# Patient Record
Sex: FEMALE | Race: WHITE | NOT HISPANIC OR LATINO | Employment: FULL TIME | URBAN - METROPOLITAN AREA
[De-identification: names, ages, dates, MRNs, and addresses within clinical notes are randomized per-mention and may not be internally consistent; named-entity substitution may affect disease eponyms.]

---

## 2021-07-01 ENCOUNTER — PATIENT MESSAGE (OUTPATIENT)
Dept: ADMINISTRATIVE | Facility: OTHER | Age: 31
End: 2021-07-01

## 2024-06-24 ENCOUNTER — TELEPHONE (OUTPATIENT)
Dept: NEUROLOGY | Facility: CLINIC | Age: 34
End: 2024-06-24
Payer: COMMERCIAL

## 2024-06-24 NOTE — TELEPHONE ENCOUNTER
----- Message from Diamone Speed sent at 6/24/2024  1:31 PM CDT -----  Regarding: self  Type: Patient Call Back       Who called: self        What is the request in detail: needs a call back to get an appt        Can the clinic reply by [facility]? Yes       Would the patient rather a call back or a response via [facility]? Call tai       Best call back number:[phone]      Additional Information:

## 2024-10-21 ENCOUNTER — LAB VISIT (OUTPATIENT)
Dept: LAB | Facility: HOSPITAL | Age: 34
End: 2024-10-21
Attending: PSYCHIATRY & NEUROLOGY
Payer: COMMERCIAL

## 2024-10-21 ENCOUNTER — PATIENT MESSAGE (OUTPATIENT)
Dept: NEUROLOGY | Facility: CLINIC | Age: 34
End: 2024-10-21

## 2024-10-21 ENCOUNTER — OFFICE VISIT (OUTPATIENT)
Dept: NEUROLOGY | Facility: CLINIC | Age: 34
End: 2024-10-21
Payer: COMMERCIAL

## 2024-10-21 VITALS
HEIGHT: 65 IN | RESPIRATION RATE: 16 BRPM | DIASTOLIC BLOOD PRESSURE: 87 MMHG | WEIGHT: 145.5 LBS | BODY MASS INDEX: 24.24 KG/M2 | SYSTOLIC BLOOD PRESSURE: 123 MMHG

## 2024-10-21 DIAGNOSIS — R29.818 OTHER SYMPTOMS AND SIGNS INVOLVING THE NERVOUS SYSTEM: ICD-10-CM

## 2024-10-21 DIAGNOSIS — Z82.49 FAMILY HISTORY OF ANEURYSM: ICD-10-CM

## 2024-10-21 DIAGNOSIS — G43.009 MIGRAINE WITHOUT AURA AND WITHOUT STATUS MIGRAINOSUS, NOT INTRACTABLE: ICD-10-CM

## 2024-10-21 DIAGNOSIS — G43.009 MIGRAINE WITHOUT AURA AND WITHOUT STATUS MIGRAINOSUS, NOT INTRACTABLE: Primary | ICD-10-CM

## 2024-10-21 LAB
CREAT SERPL-MCNC: 0.8 MG/DL (ref 0.5–1.4)
EST. GFR  (NO RACE VARIABLE): >60 ML/MIN/1.73 M^2

## 2024-10-21 PROCEDURE — 82565 ASSAY OF CREATININE: CPT | Performed by: PSYCHIATRY & NEUROLOGY

## 2024-10-21 PROCEDURE — 99999 PR PBB SHADOW E&M-EST. PATIENT-LVL V: CPT | Mod: PBBFAC,,, | Performed by: PSYCHIATRY & NEUROLOGY

## 2024-10-21 PROCEDURE — 1159F MED LIST DOCD IN RCRD: CPT | Mod: CPTII,S$GLB,, | Performed by: PSYCHIATRY & NEUROLOGY

## 2024-10-21 PROCEDURE — 36415 COLL VENOUS BLD VENIPUNCTURE: CPT | Performed by: PSYCHIATRY & NEUROLOGY

## 2024-10-21 PROCEDURE — 3074F SYST BP LT 130 MM HG: CPT | Mod: CPTII,S$GLB,, | Performed by: PSYCHIATRY & NEUROLOGY

## 2024-10-21 PROCEDURE — 99205 OFFICE O/P NEW HI 60 MIN: CPT | Mod: S$GLB,,, | Performed by: PSYCHIATRY & NEUROLOGY

## 2024-10-21 PROCEDURE — 3079F DIAST BP 80-89 MM HG: CPT | Mod: CPTII,S$GLB,, | Performed by: PSYCHIATRY & NEUROLOGY

## 2024-10-21 PROCEDURE — 3008F BODY MASS INDEX DOCD: CPT | Mod: CPTII,S$GLB,, | Performed by: PSYCHIATRY & NEUROLOGY

## 2024-10-21 RX ORDER — MUPIROCIN 20 MG/G
OINTMENT TOPICAL
COMMUNITY
Start: 2024-10-15

## 2024-10-21 RX ORDER — TOPIRAMATE 25 MG/1
25 TABLET ORAL 2 TIMES DAILY
Qty: 180 TABLET | Refills: 3 | Status: SHIPPED | OUTPATIENT
Start: 2024-10-21 | End: 2025-10-21

## 2024-10-21 RX ORDER — RIZATRIPTAN BENZOATE 10 MG/1
10 TABLET, ORALLY DISINTEGRATING ORAL
Qty: 9 TABLET | Refills: 3 | Status: SHIPPED | OUTPATIENT
Start: 2024-10-21 | End: 2024-11-20

## 2024-10-21 RX ORDER — KETOCONAZOLE 20 MG/ML
SHAMPOO, SUSPENSION TOPICAL
COMMUNITY
Start: 2024-10-15

## 2024-10-21 RX ORDER — EFINACONAZOLE 100 MG/ML
SOLUTION TOPICAL
COMMUNITY
Start: 2024-10-15

## 2024-10-21 NOTE — PROGRESS NOTES
"Subjective:       Patient ID: Hayley Contreras is a 34 y.o. female.    Chief Complaint: Headache          HPI    The patient presented on 10- for evaluation of headaches.        The patient is here for evaluation of longstanding history of headaches. The headaches progress from different areas and involves different sides with a throbbing nature. It can involve the right side, the left side or both sides. No visual aura. No associated neurological deficits.  The headaches are getting more frequent and come every day and OTC Ibuprofen at 800 mg was not helpful. The headaches are also getting more severe 6-8/10 headaches that cause significant morbidity and cluster around menstrual period. The headache is associated with nausea and light, sound and smell sensitivity. The patient feels "down" during the headache with no racing. Physical and emotional stressors provoke and aggravate the headache.  The headache builds up slowly towards the middle of the day or the end of the day.   The headaches are progressively getting worse and interfering with daily activity.  The headache is associated with scalp sensitivity. No TMJ problems.  The patient denies blurry vision and ear ringing. The headache is not positional or postural but worsens with movement and Valsalva. Sleep help lessen the headache. No history of head trauma. No history of seizures. No history of smoking. No caffeine overuse. No vertigo. No blacking out.  No fever, chills, or rigors. No history of significant memory loss. No history of strokes.  The patient cannot think of food that aggravates the headache. Family history is unremarkable for migraine but significant for cerebral aneurysm.           Review of Systems   Constitutional:  Negative for appetite change and fatigue.   HENT:  Negative for hearing loss and tinnitus.    Eyes:  Negative for photophobia and visual disturbance.   Respiratory:  Negative for apnea and shortness of breath.  "   Cardiovascular:  Negative for chest pain and palpitations.   Gastrointestinal:  Negative for nausea and vomiting.   Endocrine: Negative for cold intolerance and heat intolerance.   Genitourinary:  Negative for difficulty urinating and urgency.   Musculoskeletal:  Negative for arthralgias, back pain, gait problem, joint swelling, myalgias, neck pain and neck stiffness.   Skin:  Negative for color change and rash.   Allergic/Immunologic: Negative for environmental allergies and immunocompromised state.   Neurological:  Positive for headaches. Negative for dizziness, tremors, seizures, syncope, facial asymmetry, speech difficulty, weakness, light-headedness and numbness.   Hematological:  Negative for adenopathy. Does not bruise/bleed easily.   Psychiatric/Behavioral:  Negative for agitation, behavioral problems, confusion, decreased concentration, dysphoric mood, hallucinations, self-injury, sleep disturbance and suicidal ideas. The patient is not hyperactive.                  Current Outpatient Medications:     clindamycin-benzoyl peroxide gel, Apply topically once daily., Disp: 45 g, Rfl: 2    JUBLIA 10 % Javi, Apply topically., Disp: , Rfl:     ketoconazole (NIZORAL) 2 % shampoo, Apply topically., Disp: , Rfl:     multivitamin (THERAGRAN) per tablet, Take 1 tablet by mouth once daily., Disp: , Rfl:     mupirocin (BACTROBAN) 2 % ointment, SMARTSI Application Topical 2-3 Times Daily, Disp: , Rfl:     omeprazole (PRILOSEC) 40 MG capsule, Take 1 capsule (40 mg total) by mouth once daily., Disp: 90 capsule, Rfl: 3    UNABLE TO FIND, Inject 0.5 mg into the skin once a week. medication name: semiglutide, Disp: , Rfl:     rizatriptan (MAXALT-MLT) 10 MG disintegrating tablet, Take 1 tablet (10 mg total) by mouth as needed for Migraine (Max 2-3 times a week). May repeat in 2 hours if needed, Disp: 9 tablet, Rfl: 3    topiramate (TOPAMAX) 25 MG tablet, Take 1 tablet (25 mg total) by mouth 2 (two) times daily. Start 1 tab  QHS for 1 week then 1 tab BID., Disp: 180 tablet, Rfl: 3    Past Medical History:   Diagnosis Date    Acne     Adult    Allergy     Anxiety     Asthma     COVID-19 01/17/2022    covid vaccine X 2 (pfizer)    Depression     History of chicken pox        Past Surgical History:   Procedure Laterality Date    ARTHROSCOPY OF KNEE Bilateral     COLONOSCOPY  05/17/2021    TONSILLECTOMY      WISDOM TOOTH EXTRACTION         Social History     Socioeconomic History    Marital status:     Number of children: 2   Occupational History    Occupation: Dental Hygenist     Comment: [employer]   Tobacco Use    Smoking status: Never    Smokeless tobacco: Never   Substance and Sexual Activity    Alcohol use: Yes     Comment: Social    Drug use: Never    Sexual activity: Yes     Partners: Male     Birth control/protection: Partner-Vasectomy             Past/Current Medical/Surgical History, Past/Current Social History, Past/Current Family History and Past/Current Medications were reviewed in detail.        Objective:           VITAL SIGNS WERE REVIEWED      GENERAL APPEARANCE:     The patient looks comfortable.    BMI 24.21    No signs of respiratory distress.    Normal breathing pattern.    No dysmorphic features    Normal eye contact.       GENERAL MEDICAL EXAM:    HEENT:  Head is atraumatic normocephalic.     FUNDUSCOPIC (OPHTHALMOSCOPIC) EXAMINATION showed no disc edema (papilledema).      NECK: No JVD. No visible lesions or goiters.     CHEST-CARDIOPULMONARY: No cyanosis. No tachypnea. Normal respiratory effort.    NKILCWZ-HOYKSXPDETJHJEKT-MANGEMVSJI: No jaundice. No stomas or lesions. No visible hernias. No catheters.     SKIN, HAIR, NAILS: No pathognomonic skin rash.No neurofibromatosis. No visible lesions.No stigmata of autoimmune disease. No clubbing.    LIMBS: No varicose veins. No visible swelling.    MUSCULOSKELETAL: No visible deformities.No visible lesions.             Neurological Exam  Mental Status  Awake  and alert. Oriented to person, place, time and situation. Recent and remote memory are intact. Speech is normal. Language is fluent with no aphasia. Attention and concentration are normal. Fund of knowledge is appropriate for level of education. Apraxia absent.    Cranial Nerves  CN II: Tested with correction. Visual acuity is normal. Visual fields full to confrontation. Right funduscopic exam: disc intact. Left funduscopic exam: disc intact.  CN III, IV, VI: Extraocular movements intact bilaterally. Normal lids and orbits bilaterally. Pupils equal round and reactive to light bilaterally.  CN V: Facial sensation is normal.  CN VII: Full and symmetric facial movement.  CN VIII: Hearing is normal.  CN IX, X: Palate elevates symmetrically  CN XI: Shoulder shrug strength is normal.  CN XII: Tongue midline without atrophy or fasciculations.    Motor  Normal muscle bulk throughout. No fasciculations present. Normal muscle tone. No abnormal involuntary movements. No pronator drift.                                             Right                     Left  Neck flexion                           5                          5  Neck extension                      5                          5   Shoulder abduction               5                          5   Shoulder adduction               5                          5   Shoulder internal rotation      5                          5  Shoulder external rotation     5                          5  Elbow flexion                         5                          5  Elbow extension                    5                          5  Wrist flexion                           5                          5  Wrist extension                      5                          5  Supination                             5                          5  Pronation                               5                          5  Finger flexion                         5                          5  Finger extension                     5                          5  Finger abduction                    5                          5  Thumb flexion                        5                          5  Thumb extension                   5                          5  Thumb abduction                   5                          5  Hip flexion                              5                          5  Hip extension                         5                          5  Hip abduction                         5                          5  Hip adduction                         5                          5  Knee flexion                           5                          5  Knee extension                      5                          5  Ankle inversion                      5                          5  Ankle eversion                       5                          5  Plantarflexion                         5                          5  Dorsiflexion                            5                          5  Toe flexion                             5                          5  Toe extension                        5                          5    Sensory  Light touch is normal in upper and lower extremities. Pinprick is normal in upper and lower extremities. Temperature is normal in upper and lower extremities. Vibration is normal in upper and lower extremities. Proprioception is normal in upper and lower extremities.  No right-sided hemispatial neglect. No left-sided hemispatial neglect. Right agraphesthesia absent. Left agraphesthesia absent. Right astereognosis absent. Left astereognosis absent.    Reflexes                                            Right                      Left  Brachioradialis                    2+                         2+  Biceps                                 2+                         2+  Triceps                                2+                         2+  Patellar                                2+                          2+  Achilles                                2+                         2+  Right Plantar: normal  Left Plantar: normal  Jaw jerk absent.  Right pathological reflexes: Shanna's absent. Crossed adductor absent. Ankle clonus absent.  Left pathological reflexes: Shanna's absent. Crossed adductor absent. Ankle clonus absent.    Coordination  Right: Finger-to-nose normal. Rapid alternating movement normal. Heel-to-shin normal.Left: Finger-to-nose normal. Rapid alternating movement normal. Heel-to-shin normal.    Gait  Casual gait is normal including stance, stride, and arm swing.Normal toe walking. Normal heel walking. Normal tandem gait. Romberg is absent. Able to rise from chair without using arms.        Lab Results   Component Value Date    WBC 4.6 06/24/2024    HGB 14.3 06/24/2024    HCT 43.4 06/24/2024    MCV 92 06/24/2024     06/24/2024       Sodium   Date Value Ref Range Status   06/24/2024 141 134 - 144 mmol/L Final     Potassium   Date Value Ref Range Status   06/24/2024 4.5 3.5 - 5.2 mmol/L Final     Chloride   Date Value Ref Range Status   06/24/2024 104 96 - 106 mmol/L Final     CO2   Date Value Ref Range Status   06/24/2024 26 20 - 29 mmol/L Final     Glucose   Date Value Ref Range Status   06/24/2024 81 70 - 99 mg/dL Final     BUN   Date Value Ref Range Status   06/24/2024 11 6 - 20 mg/dL Final     Creatinine   Date Value Ref Range Status   10/21/2024 0.8 0.5 - 1.4 mg/dL Final     Calcium   Date Value Ref Range Status   06/24/2024 9.5 8.7 - 10.2 mg/dL Final     Total Protein   Date Value Ref Range Status   04/29/2020 6.6 6.0 - 8.5 g/dL Final     Albumin   Date Value Ref Range Status   06/24/2024 4.4 3.9 - 4.9 g/dL Final   04/29/2020 4.4 3.8 - 4.8 g/dL Final     Total Bilirubin   Date Value Ref Range Status   06/24/2024 0.2 0.0 - 1.2 mg/dL Final     Alkaline Phosphatase   Date Value Ref Range Status   04/29/2020 68 39 - 117 IU/L Final     AST   Date Value Ref Range Status   06/24/2024 21 0 - 40  IU/L Final     ALT   Date Value Ref Range Status   06/24/2024 30 0 - 32 IU/L Final     eGFR if non    Date Value Ref Range Status   08/24/2021 100 >59 mL/min/1.73 Final           Lab Results   Component Value Date    TSH 1.250 06/24/2024    W0OCVUR 7.3 08/24/2021       LABORATORY EVALUATION      [id number]      CBC, CMP, TFT , HA1C NL.        RADIOLOGY EVALUATION       NEUROPHYSIOLOGY EVALUATION       PATHOLOGY EVALUATION        NEUROCOGNITIVE AND NEUROPSYCHOLOGY EVALUATION           Reviewed the neuroimaging independently       Assessment:           1. Migraine without aura and without status migrainosus, not intractable    2. Other symptoms and signs involving the nervous system    3. Family history of aneurysm          Plan:            IS STATUS POST VASECTOMY       MIGRAINE, COMMON, WITHOUT AURA, EPISODIC, HIGH FREQUENCY         EVALUATION       BRAIN MRI WO.    CTA H/N.        MANAGEMENT       HEADACHE DIARY     DISCUSSED THE THREE-FOLD MANAGEMENT OF MIGRAINE:      LIFESTYLE CHANGES:       Good sleep hygiene  Avoid general triggers like lack of sleep/too much sleep, prolonged sun exposure, excessive screen time and specific triggers based on you own diary   Minimize physical and emotional stress  Smoking avoidance and cessation  Limit caffeine drinks to 1-2 a day   Good hydration   Small frequent meals and avoid skipping meals   Moderate 30-minute-long aerobic exercises 3 times/week. Avoid strenuous exercise         ABORTIVE MEDICATIONS (ACUTE-RESCUE MEDICATIONS):     Should only be taken 2-3 times/week to avoid rebound and overuse headaches.    I-explained to the patient that pain meds especially triptans should NOT be taken daily to avoid Rebound Headache and Overuse Headache.    Take at the ONSET of the headache Rizatriptan (Maxalt MLT) 10 mg PO  PRN.        AVOID NARCOTICS (OPIATES)      1. No randomized controlled study shows pain-free results with opioids in the treatment of  migraine.     2. The physiologic consequences of opioid use are adverse, occur quickly, and can be permanent. Decreased gray matter, release of calcitonin gene-related peptide, dynorphin, and pro-inflammatory peptides, and activation of excitatory glutamate receptors are all associated with opioid exposure.     3. Opioids are pro-nociceptive, prevent reversal of migraine central sensitization, and interfere with triptan effectiveness.     4.Opioids precipitate bad clinical outcomes, especially transformation to daily headache.     5. They cause disease progression, comorbidity, and excessive health care consumption.           NEXT OPTIONS:    Triptans: Sumatriptan (Imitrex)    Gepants: Nuretc (rimegepant)75 mg >Ubrelvy (ubrogepant) 100 mg    Ditans: Reyvow (lasmiditan) 100 mg (No driving due to sedation)    Fioricet without codeine with Reglan.      Prednisone with Reglan.      LAST RESORT:     DHE NS Trudhesa (Max 2 a week)     C/I: concomitant use of vasoconstrictors like Triptans, strong CY inhibitors such as HAART PIs (eg, ritonavir, nelfinavir, or indinavir) and Macrolides (eg, erythromycin or clarithromycin), CAD, PVD, Stroke/TIA and Uncontrolled HTN.  Serious SEs include Vasospasm and Fibrosis (chronic use).           PREVENTATIVE (MORE ACCURATELY MIGRAINE REDUCTION) MEDICATIONS:           Since the patient's headache is very frequent a lengthy discussion about preventative medications was carried out.The patient understands that prevention means DECREASING frequency and severity and NOT elimination.The patient was made aware that any new medication can cause serious allergic reaction.The medication is considered failure only if a therapeutic dose reached and maintained for 6-8 weeks.        Topiramate/Topamax (TPM) slow titration to 25 mg BID which can cause mental slowing, transient tingling, kidney stones, weight loss, cleft lip and palate and rarely glaucoma and visual field defects . The patient was  encouraged to drink a lot of fluids.     HELPFUL SUPPLEMENTS:     Helpful supplements include Co-Q 10, B2, Mg, Feverfew (Dolovent combination) and butterbur (Petadolex)        NEUROPHARMACOLOGY     NEXT OPTIONS:         Zonisamide/Zonegran (ZNS) 100-400 mg QHS is a good alternative to TPM in case of SE/AE.     Amitriptyline/Elavil (TCA) slow titration to 100-Age which can cause sleepiness, dry eyes, dry mouth, urinary retention, and rarely cardiac arrhythmias    Propranolol/Inderal  (BB)slow titration to 80 mg BID which can cause low blood pressure, slow heart rate, erectile dysfunction, depression, airway obstruction and heart failure exacerbations. Cannot be used with migraine associate with focal neurological deficits.    Lamotrigine/Lamictal  (LTG)slow titration to 100 mg BID which can cause serious skin rash and rare cardiac arrhythmias. LTG is superior to other therapies for specifically reducing migraine aura.     ANTI-CGRP AGENTS: Qulipta (alogepant) 60 mg QD, Erenumab (Aimovig) 140 mg SQ Pen monthly (Reported cases of Constipation and BP elevation) , Galcanezumab (Emgality) 120 mg SQ Pen monthly after a loading dose of 240 mg  and Fremnezumab (Ajovy) (Ligand Blocker): 225 mg SQ monthly or 675 mg every 3 months     Botox 200 units every 3 months .        LAST RESORT OPTIONS:      Namenda 10 mg BID     Valproic acid/ Depakote         NEUROMODULATION     Cefaly, Relivion, Nerivio and GammaCore (VNS)               WOMEN IN CHILD BEARING PERIOD     All migraine medications are not safe during pregnancy and the patient was made aware of this fact. Any pregnancy should be planned, and medications should be stopped PRIOR to pregnancy planning. Folic acid 1 mg daily was recommended. However, hormonal birth control complicates the management of migraine and can exacerbate migraine. If possible, mechanical contraception should be a better option.         MEDICAL/SURGICAL COMORBIDITIES     All relevant medical  comorbidities noted and managed by primary care physician and medical care team.          HEALTHY LIFESTYLE AND PREVENTATIVE CARE    The patient to adhere to the age-appropriate health maintenance guidelines including screening tests and vaccinations. The patient to adhere to  healthy lifestyle, optimal weight, exercise, healthy diet, good sleep hygiene and avoiding drugs including smoking, alcohol and recreational drugs.          RTC 3 MONTHS     Jennifer Trejo MD, FAAN    Attending Neurologist/Epileptologist         Diplomate, American Board of Psychiatry and Neurology    Diplomate, American Board of Clinical Neurophysiology     Fellow, American Academy of Neurology       I spent a total of 62 minutes on the day of the visit.  This includes face to face time and non-face to face time preparing to see the patient (eg, review of tests), obtaining and/or reviewing separately obtained history, documenting clinical information in the electronic or other health record, independently interpreting results and communicating results to the patient/family/caregiver, or care coordinator.

## 2024-10-21 NOTE — PATIENT INSTRUCTIONS
LIFESTYLE CHANGES:       Good sleep hygiene  Avoid general triggers like lack of sleep/too much sleep, prolonged sun exposure, excessive screen time and specific triggers based on you own diary   Minimize physical and emotional stress  Smoking avoidance and cessation  Limit caffeine drinks to 1-2 a day   Good hydration   Small frequent meals and avoid skipping meals   Moderate 30-minute-long aerobic exercises 3 times/week. Avoid strenuous exercise           HELPFUL SUPPLEMENTS:     Helpful supplements include Co-Q 10, B2, Mg, Feverfew (Dolovent combination) and butterbur (Petadolex)        What Is a Migraine? 31 Facts vs. Fiction    Migraine is a common neurological disorder that affects 12% to 15% of general population. Despite that, it is usually underdiagnosed and inadequately treated due to many misconceptions surrounding the diagnosis and treatment.    Myth: Migraine affects women only. Fact: Migraine affect both women and men, though they are more common in women.  Myth: Migraine starts in the teen years. Fact: Migraine can start at any age, though its more common than the teenage period.  Myth: People outgrow migraines. Fact: While some may outgrow their migraines, most patients do not.  Myth: Migraine is just a bad headache. Fact: Migraine is a syndrome with many symptoms (physical and behavioral). Headache is only one of them. Some migraine attacks occur without headaches. Migraine can cause symptoms that can mimic stroke.  Myth: Migraines must have aura (symptoms or sensations, such as flashing lights, which can come before a migraine headache.) Fact: Most migraines are called common migraines and are not preceded by aura.  Myth: A migraine begins when the headache starts. Fact: A prodromal period lasts two to three days before the headache starts. Patients may have irritability, sleep problems and other behavioral symptoms before the headache starts.  Myth: Migraine ends when the headache is  gone. Fact: A postdrome period after the headache is over can last few days and cause excessive fatigue.  Myth: There is always a trigger for migraines. Fact: Many patients cannot identify triggers.  Myth: Migraine triggers are universal. Fact: Every patient has unique migraine triggers -- if they can identify triggers.  Myth: Migraine symptoms are the same in different people. Fact: Migraine symptoms vary from patient to patient and even in the same patient. Migraines are unpredictable and continue to change throughout life.  Myth: Bad headache means migraine. Fact: Migraine can cause bad headache but there are numerous types of headaches other than migraine.  Myth: Mild headache is not migraine. Fact: Migraine causes mild, moderate or severe headaches. The severity has nothing to do with migraine diagnosis.  Myth: Brain scans diagnose migraine. Fact: migraine is a clinical diagnosis based on symptoms. Brain scans are done to rule out migraine mimickers. Brain scans are normal in migraine patients. The disease process is neurochemical and not structural.  Myth: There are no good treatments for migraine. Fact: While there is no cure for migraines, there are numerous treatment options that can help control and reduce migraines.  Myth: Migraine preventative medications should get rid of all migraines. Fact: while this can happen, the goal of treatment is 50% reduction of frequency and severity of migraines.  Myth: I probably just have a sinus headache. Fact: Ninety percent of self-diagnosed sinus headaches are undiagnosed migraines.  Myth: Lifestyle has no impact on migraines. Fact: Lifestyle has significant impact on migraine and modifications can help.  Myth: Migraines are always hormone related. Fact: Some migraines are influenced by hormonal changes (menstrual period, birth control, hormone replacement, pregnancy and endometriosis), but not all migraines.  Myth: Taking daily over-the-counter medication is a  good idea. Fact: Taking abortive medications, which stop a migraine when you feel one coming or once it has begun, more than two to three times a week worsens migraines and causes chronic rebound headaches.  Myth: Caffeine helps with migraine. Fact: In the short term, this is true. However, overuse of caffeine can make migraine headaches worse long-term.  Myth: Migraine preventative medications work very quickly. Fact: While abortive medications work quickly, preventative medications require several weeks to months to have full effect.  Myth: No migraine treatment is available if you are pregnant. Fact: While most migraine medications are not safe during pregnancy, there are options like acetaminophen, antinausea medications and nerve stimulation devices like Cefaly and Relivion.  Myth: Anyone can diagnose and treat migraine. Fact: Migraines are usually misdiagnosed so it important to seek treatment for a specialist that is adequately trained to properly treat.  Myth: Opiates (narcotics) are good for migraine headaches. Fact: Opiates can have negative results, including leading to daily headaches.  Myth: All migraine treatments involve taking tablets. Fact: Injectable options and even nerve stimulation devices like Cefaly and Relivion are available.  Myth: Botox injections are not used in migraine headaches. Fact: Botox injections are very effective treatment options for patients who do not respond to first-line treatment.  Myth: Supplements and alternative methods do not help with migraines. Fact: Some supplements like vitamin B2, magnesium, Co-Q 10, butterbur and feverfew do help. Massage therapy, essential oils and acupuncture can help as well. It is best to use such methods as complementary to standard medical treatment rather than as an alternative therapy.  Myth: You must choose one thing to help with migraines. Fact: Migraine management is hard work, and the patient will need to do many things at the same  time to control migraines including lifestyle changes, triggers avoidance and sometimes a combination of different medications.  Myth: Piercing and earrings can treat migraines. Fact: We do not have any evidence to support the use of piercing as a migraine treatment. Some of our patients report that piercing temporarily helped reduce their headaches  Myth: Migraines are bothersome but not serious. Fact: Some migraines can last a long time. This is called migrainosus, and it causes significant physical and emotional harm.  Myth: There is no surgical treatment for migraine. Fact: There are surgical treatment options for migraines that do not respond to medical management. The specific type of surgery depends on the nature of the migraines.

## 2024-11-05 ENCOUNTER — HOSPITAL ENCOUNTER (OUTPATIENT)
Dept: RADIOLOGY | Facility: HOSPITAL | Age: 34
Discharge: HOME | End: 2024-11-05
Attending: PSYCHIATRY & NEUROLOGY
Payer: COMMERCIAL

## 2024-11-05 DIAGNOSIS — R29.818 OTHER SYMPTOMS AND SIGNS INVOLVING THE NERVOUS SYSTEM: ICD-10-CM

## 2024-11-05 DIAGNOSIS — G43.009 MIGRAINE WITHOUT AURA AND WITHOUT STATUS MIGRAINOSUS, NOT INTRACTABLE: ICD-10-CM

## 2024-11-05 DIAGNOSIS — Z82.49 FAMILY HISTORY OF ANEURYSM: ICD-10-CM

## 2024-11-05 PROCEDURE — 70551 MRI BRAIN STEM W/O DYE: CPT | Mod: 26,,, | Performed by: RADIOLOGY

## 2024-11-05 PROCEDURE — 70498 CT ANGIOGRAPHY NECK: CPT | Mod: 26,,, | Performed by: RADIOLOGY

## 2024-11-05 PROCEDURE — 70496 CT ANGIOGRAPHY HEAD: CPT | Mod: 26,,, | Performed by: RADIOLOGY

## 2024-11-05 PROCEDURE — 70498 CT ANGIOGRAPHY NECK: CPT | Mod: TC

## 2024-11-05 PROCEDURE — 70551 MRI BRAIN STEM W/O DYE: CPT | Mod: TC

## 2024-11-05 PROCEDURE — 25500020 PHARM REV CODE 255: Performed by: PSYCHIATRY & NEUROLOGY

## 2024-11-05 RX ADMIN — IOHEXOL 100 ML: 350 INJECTION, SOLUTION INTRAVENOUS at 04:11

## 2024-12-09 ENCOUNTER — PATIENT MESSAGE (OUTPATIENT)
Dept: NEUROLOGY | Facility: CLINIC | Age: 34
End: 2024-12-09
Payer: COMMERCIAL

## 2024-12-12 ENCOUNTER — PATIENT MESSAGE (OUTPATIENT)
Dept: RESEARCH | Facility: HOSPITAL | Age: 34
End: 2024-12-12
Payer: COMMERCIAL

## 2025-02-10 ENCOUNTER — PATIENT MESSAGE (OUTPATIENT)
Dept: NEUROLOGY | Facility: CLINIC | Age: 35
End: 2025-02-10
Payer: COMMERCIAL

## 2025-02-25 ENCOUNTER — OFFICE VISIT (OUTPATIENT)
Dept: NEUROLOGY | Facility: CLINIC | Age: 35
End: 2025-02-25
Payer: COMMERCIAL

## 2025-02-25 VITALS
WEIGHT: 154.31 LBS | HEIGHT: 65 IN | HEART RATE: 76 BPM | SYSTOLIC BLOOD PRESSURE: 107 MMHG | DIASTOLIC BLOOD PRESSURE: 70 MMHG | BODY MASS INDEX: 25.71 KG/M2

## 2025-02-25 DIAGNOSIS — Q79.69 EHLERS-DANLOS SYNDROME, FAMILIAL JOINT LAXITY TYPE: ICD-10-CM

## 2025-02-25 DIAGNOSIS — Z82.49 FAMILY HISTORY OF ANEURYSM: ICD-10-CM

## 2025-02-25 DIAGNOSIS — G43.009 MIGRAINE WITHOUT AURA AND WITHOUT STATUS MIGRAINOSUS, NOT INTRACTABLE: Primary | ICD-10-CM

## 2025-02-25 DIAGNOSIS — R00.2 PALPITATIONS: ICD-10-CM

## 2025-02-25 DIAGNOSIS — Z91.89 RELIES ON PARTNER VASECTOMY FOR CONTRACEPTION: ICD-10-CM

## 2025-02-25 DIAGNOSIS — M54.81 CERVICO-OCCIPITAL NEURALGIA: ICD-10-CM

## 2025-02-25 DIAGNOSIS — Z86.19 HISTORY OF SHINGLES: ICD-10-CM

## 2025-02-25 PROCEDURE — G2211 COMPLEX E/M VISIT ADD ON: HCPCS | Mod: S$GLB,,, | Performed by: PSYCHIATRY & NEUROLOGY

## 2025-02-25 PROCEDURE — 1159F MED LIST DOCD IN RCRD: CPT | Mod: CPTII,S$GLB,, | Performed by: PSYCHIATRY & NEUROLOGY

## 2025-02-25 PROCEDURE — 99999 PR PBB SHADOW E&M-EST. PATIENT-LVL III: CPT | Mod: PBBFAC,,, | Performed by: PSYCHIATRY & NEUROLOGY

## 2025-02-25 PROCEDURE — 3074F SYST BP LT 130 MM HG: CPT | Mod: CPTII,S$GLB,, | Performed by: PSYCHIATRY & NEUROLOGY

## 2025-02-25 PROCEDURE — 99215 OFFICE O/P EST HI 40 MIN: CPT | Mod: S$GLB,,, | Performed by: PSYCHIATRY & NEUROLOGY

## 2025-02-25 PROCEDURE — 3008F BODY MASS INDEX DOCD: CPT | Mod: CPTII,S$GLB,, | Performed by: PSYCHIATRY & NEUROLOGY

## 2025-02-25 PROCEDURE — 3078F DIAST BP <80 MM HG: CPT | Mod: CPTII,S$GLB,, | Performed by: PSYCHIATRY & NEUROLOGY

## 2025-02-25 NOTE — PROGRESS NOTES
"Subjective:       Patient ID: Hayley Contreras is a 34 y.o. female.    Chief Complaint: No chief complaint on file.          HPI          BACKGROUND HISTORY         The patient presented on 10- for evaluation of headaches.      The patient is here for evaluation of longstanding history of headaches. The headaches progress from different areas and involves different sides with a throbbing nature. It can involve the right side, the left side or both sides. No visual aura. No associated neurological deficits.  The headaches are getting more frequent and come every day and OTC Ibuprofen at 800 mg was not helpful. The headaches are also getting more severe 6-8/10 headaches that cause significant morbidity and cluster around menstrual period. The headache is associated with nausea and light, sound and smell sensitivity. The patient feels "down" during the headache with no racing. Physical and emotional stressors provoke and aggravate the headache.  The headache builds up slowly towards the middle of the day or the end of the day.   The headaches are progressively getting worse and interfering with daily activity.  The headache is associated with scalp sensitivity. No TMJ problems.  The patient denies blurry vision and ear ringing. The headache is not positional or postural but worsens with movement and Valsalva. Sleep help lessen the headache. No history of head trauma. No history of seizures. No history of smoking. No caffeine overuse. No vertigo. No blacking out.  No fever, chills, or rigors. No history of significant memory loss. No history of strokes.  The patient cannot think of food that aggravates the headache. Family history is unremarkable for migraine but significant for cerebral aneurysm.  Ordered Brain MRI, CTA H/N, tried Rizatriptan (Maxalt) MLT 10 mg PRN and TPM titration to 25 mg PO BID.         INTERVAL HISTORY     The patient presented on 02- for follow up evaluation of headaches.      On " 11- Brain MRI WO NL., CTA H/N NL.  Had only to take Rizatriptan (Maxalt) MLT 10 mg PRN once as TPM titration to 25 mg PO BID almost eradicated her migraines.    The patient developed zoster (shingles) on 11- on her lower back. She was prescribed ACV and  mg PO QHS. Denied PHN.    Noted that  mg PO QHS Has helped with her occipital neuralgia pain as well.     She will be evaluated by cardiology for orthostatic tachycardia. Her daughter has EDS and JRA.    Review of Systems   Constitutional:  Negative for appetite change and fatigue.   HENT:  Negative for hearing loss and tinnitus.    Eyes:  Negative for photophobia and visual disturbance.   Respiratory:  Negative for apnea and shortness of breath.    Cardiovascular:  Negative for chest pain and palpitations.   Gastrointestinal:  Negative for nausea and vomiting.   Endocrine: Negative for cold intolerance and heat intolerance.   Genitourinary:  Negative for difficulty urinating and urgency.   Musculoskeletal:  Negative for arthralgias, back pain, gait problem, joint swelling, myalgias, neck pain and neck stiffness.   Skin:  Negative for color change and rash.   Allergic/Immunologic: Negative for environmental allergies and immunocompromised state.   Neurological:  Positive for headaches. Negative for dizziness, tremors, seizures, syncope, facial asymmetry, speech difficulty, weakness, light-headedness and numbness.   Hematological:  Negative for adenopathy. Does not bruise/bleed easily.   Psychiatric/Behavioral:  Negative for agitation, behavioral problems, confusion, decreased concentration, dysphoric mood, hallucinations, self-injury, sleep disturbance and suicidal ideas. The patient is not hyperactive.                  Current Outpatient Medications:     clindamycin-benzoyl peroxide gel, Apply topically once daily., Disp: 45 g, Rfl: 2    gabapentin (NEURONTIN) 300 MG capsule, Take 1 capsule (300 mg total) by mouth 2 (two) times daily.,  Disp: 60 capsule, Rfl: 5    ketoconazole (NIZORAL) 2 % shampoo, Apply topically., Disp: , Rfl:     multivitamin (THERAGRAN) per tablet, Take 1 tablet by mouth once daily., Disp: , Rfl:     mupirocin (BACTROBAN) 2 % ointment, SMARTSI Application Topical 2-3 Times Daily, Disp: , Rfl:     neomycin-polymyxin-dexamethasone (MAXITROL) 3.5 mg/g-10,000 unit/g-0.1 % Oint, Place into the right eye every 8 (eight) hours., Disp: 3.5 g, Rfl: 0    omeprazole (PRILOSEC) 40 MG capsule, Take 1 capsule (40 mg total) by mouth once daily., Disp: 90 capsule, Rfl: 3    rizatriptan (MAXALT-MLT) 10 MG disintegrating tablet, Take 1 tablet (10 mg total) by mouth as needed for Migraine (Max 2-3 times a week). May repeat in 2 hours if needed, Disp: 9 tablet, Rfl: 3    topiramate (TOPAMAX) 25 MG tablet, Take 1 tablet (25 mg total) by mouth 2 (two) times daily. Start 1 tab QHS for 1 week then 1 tab BID., Disp: 180 tablet, Rfl: 3    UNABLE TO FIND, Inject 0.5 mg into the skin once a week. medication name: semiglutide, Disp: , Rfl:     Past Medical History:   Diagnosis Date    Acne     Adult    Allergy     Anxiety     Asthma     COVID-19 2022    covid vaccine X 2 (pfizer)    Depression     History of chicken pox     Shingles        Past Surgical History:   Procedure Laterality Date    ARTHROSCOPY OF KNEE Bilateral     COLONOSCOPY  2021    TONSILLECTOMY      WISDOM TOOTH EXTRACTION         Social History     Socioeconomic History    Marital status:     Number of children: 2   Occupational History    Occupation: Dental Hygenist     Comment: [employer]   Tobacco Use    Smoking status: Never    Smokeless tobacco: Never   Substance and Sexual Activity    Alcohol use: Yes     Comment: Social    Drug use: Never    Sexual activity: Yes     Partners: Male     Birth control/protection: Partner-Vasectomy     Social Drivers of Health     Financial Resource Strain: Low Risk  (2024)    Overall Financial Resource Strain (CARDIA)      Difficulty of Paying Living Expenses: Not very hard   Food Insecurity: No Food Insecurity (12/2/2024)    Hunger Vital Sign     Worried About Running Out of Food in the Last Year: Never true     Ran Out of Food in the Last Year: Never true   Physical Activity: Unknown (12/2/2024)    Exercise Vital Sign     Days of Exercise per Week: 0 days   Stress: No Stress Concern Present (12/2/2024)    [employer] [place] [employer] - Occupational Stress Questionnaire     Feeling of Stress : Not at all   Housing Stability: Unknown (12/2/2024)    Housing Stability Vital Sign     Unable to Pay for Housing in the Last Year: No             Past/Current Medical/Surgical History, Past/Current Social History, Past/Current Family History and Past/Current Medications were reviewed in detail.        Objective:           VITAL SIGNS WERE REVIEWED      GENERAL APPEARANCE:     The patient looks comfortable.    BMI 25.68    No signs of respiratory distress.    Normal breathing pattern.    No dysmorphic features    Normal eye contact.       GENERAL MEDICAL EXAM:    HEENT:  Head is atraumatic normocephalic.     FUNDUSCOPIC (OPHTHALMOSCOPIC) EXAMINATION showed no disc edema (papilledema).      NECK: No JVD. No visible lesions or goiters.     CHEST-CARDIOPULMONARY: No cyanosis. No tachypnea. Normal respiratory effort.    PZHNYNR-RPVIPLJBBLZJZEDI-MGRGKATZBK: No jaundice. No stomas or lesions. No visible hernias. No catheters.     SKIN, HAIR, NAILS: No pathognomonic skin rash.No neurofibromatosis. No visible lesions.No stigmata of autoimmune disease. No clubbing.    LIMBS: No varicose veins. No visible swelling.    MUSCULOSKELETAL: No visible deformities.No visible lesions.             Neurological Exam  Mental Status  Awake and alert. Oriented to person, place, time and situation. Recent and remote memory are intact. Speech is normal. Language is fluent with no aphasia. Attention and concentration are normal. Fund of knowledge is  appropriate for level of education. Apraxia absent.    Cranial Nerves  CN II: Tested with correction. Visual acuity is normal. Visual fields full to confrontation. Right funduscopic exam: disc intact. Left funduscopic exam: disc intact.  CN III, IV, VI: Extraocular movements intact bilaterally. Normal lids and orbits bilaterally. Pupils equal round and reactive to light bilaterally.  CN V: Facial sensation is normal.  CN VII: Full and symmetric facial movement.  CN VIII: Hearing is normal.  CN IX, X: Palate elevates symmetrically  CN XI: Shoulder shrug strength is normal.  CN XII: Tongue midline without atrophy or fasciculations.    Motor  Normal muscle bulk throughout. No fasciculations present. Normal muscle tone. No abnormal involuntary movements. No pronator drift.                                             Right                     Left  Neck flexion                           5                          5  Neck extension                      5                          5   Shoulder abduction               5                          5   Shoulder adduction               5                          5   Shoulder internal rotation      5                          5  Shoulder external rotation     5                          5  Elbow flexion                         5                          5  Elbow extension                    5                          5  Wrist flexion                           5                          5  Wrist extension                      5                          5  Supination                             5                          5  Pronation                               5                          5  Finger flexion                         5                          5  Finger extension                    5                          5  Finger abduction                    5                          5  Thumb flexion                        5                          5  Thumb extension                    5                          5  Thumb abduction                   5                          5  Hip flexion                              5                          5  Hip extension                         5                          5  Hip abduction                         5                          5  Hip adduction                         5                          5  Knee flexion                           5                          5  Knee extension                      5                          5  Ankle inversion                      5                          5  Ankle eversion                       5                          5  Plantarflexion                         5                          5  Dorsiflexion                            5                          5  Toe flexion                             5                          5  Toe extension                        5                          5    Sensory  Light touch is normal in upper and lower extremities. Pinprick is normal in upper and lower extremities. Temperature is normal in upper and lower extremities. Vibration is normal in upper and lower extremities. Proprioception is normal in upper and lower extremities.  No right-sided hemispatial neglect. No left-sided hemispatial neglect. Right agraphesthesia absent. Left agraphesthesia absent. Right astereognosis absent. Left astereognosis absent.    Reflexes                                            Right                      Left  Brachioradialis                    2+                         2+  Biceps                                 2+                         2+  Triceps                                2+                         2+  Patellar                                2+                         2+  Achilles                                2+                         2+  Right Plantar: normal  Left Plantar: normal  Jaw jerk absent.  Right pathological reflexes: Shanna's absent. Crossed adductor  absent. Ankle clonus absent.  Left pathological reflexes: Shanna's absent. Crossed adductor absent. Ankle clonus absent.    Coordination  Right: Finger-to-nose normal. Rapid alternating movement normal. Heel-to-shin normal.Left: Finger-to-nose normal. Rapid alternating movement normal. Heel-to-shin normal.    Gait  Casual gait is normal including stance, stride, and arm swing.Normal toe walking. Normal heel walking. Normal tandem gait. Romberg is absent. Able to rise from chair without using arms.        Lab Results   Component Value Date    WBC 4.6 06/24/2024    HGB 14.3 06/24/2024    HCT 43.4 06/24/2024    MCV 92 06/24/2024     06/24/2024       Sodium   Date Value Ref Range Status   06/24/2024 141 134 - 144 mmol/L Final     Potassium   Date Value Ref Range Status   06/24/2024 4.5 3.5 - 5.2 mmol/L Final     Chloride   Date Value Ref Range Status   06/24/2024 104 96 - 106 mmol/L Final     CO2   Date Value Ref Range Status   06/24/2024 26 20 - 29 mmol/L Final     Glucose   Date Value Ref Range Status   06/24/2024 81 70 - 99 mg/dL Final     BUN   Date Value Ref Range Status   06/24/2024 11 6 - 20 mg/dL Final     Creatinine   Date Value Ref Range Status   10/21/2024 0.8 0.5 - 1.4 mg/dL Final     Calcium   Date Value Ref Range Status   06/24/2024 9.5 8.7 - 10.2 mg/dL Final     Total Protein   Date Value Ref Range Status   04/29/2020 6.6 6.0 - 8.5 g/dL Final     Albumin   Date Value Ref Range Status   06/24/2024 4.4 3.9 - 4.9 g/dL Final   04/29/2020 4.4 3.8 - 4.8 g/dL Final     Total Bilirubin   Date Value Ref Range Status   06/24/2024 0.2 0.0 - 1.2 mg/dL Final     Alkaline Phosphatase   Date Value Ref Range Status   04/29/2020 68 39 - 117 IU/L Final     AST   Date Value Ref Range Status   06/24/2024 21 0 - 40 IU/L Final     ALT   Date Value Ref Range Status   06/24/2024 30 0 - 32 IU/L Final     eGFR if non    Date Value Ref Range Status   08/24/2021 100 >59 mL/min/1.73 Final           Lab  Results   Component Value Date    TSH 1.250 06/24/2024    [id number] 7.3 08/24/2021           LABORATORY EVALUATION      [id number]      CBC, CMP, TFT , HA1C NL.        RADIOLOGY EVALUATION       11-    Brain MRI WO NL.    CTA H/N NL.         NEUROPHYSIOLOGY EVALUATION       PATHOLOGY EVALUATION        NEUROCOGNITIVE AND NEUROPSYCHOLOGY EVALUATION           Reviewed the neuroimaging independently       Assessment:           1. Migraine without aura and without status migrainosus, not intractable    2. Family history of aneurysm    3. History of shingles    4. Cervico-occipital neuralgia    5. Palpitations    6. Arcelia-Danlos syndrome, familial joint laxity type    7. Relies on partner vasectomy for contraception          Plan:            IS STATUS POST VASECTOMY       MIGRAINE, COMMON, WITHOUT AURA, EPISODIC, HIGH FREQUENCY         MANAGEMENT       HEADACHE DIARY     DISCUSSED THE THREE-FOLD MANAGEMENT OF MIGRAINE:      LIFESTYLE CHANGES:       Good sleep hygiene  Avoid general triggers like lack of sleep/too much sleep, prolonged sun exposure, excessive screen time and specific triggers based on you own diary   Minimize physical and emotional stress  Smoking avoidance and cessation  Limit caffeine drinks to 1-2 a day   Good hydration   Small frequent meals and avoid skipping meals   Moderate 30-minute-long aerobic exercises 3 times/week. Avoid strenuous exercise         ABORTIVE MEDICATIONS (ACUTE-RESCUE MEDICATIONS):     Should only be taken 2-3 times/week to avoid rebound and overuse headaches.    I-explained to the patient that pain meds especially triptans should NOT be taken daily to avoid Rebound Headache and Overuse Headache.    Take at the ONSET of the headache Rizatriptan (Maxalt MLT) 10 mg PO  PRN.        AVOID NARCOTICS (OPIATES)      1. No randomized controlled study shows pain-free results with opioids in the treatment of migraine.     2. The physiologic consequences of opioid use are  adverse, occur quickly, and can be permanent. Decreased gray matter, release of calcitonin gene-related peptide, dynorphin, and pro-inflammatory peptides, and activation of excitatory glutamate receptors are all associated with opioid exposure.     3. Opioids are pro-nociceptive, prevent reversal of migraine central sensitization, and interfere with triptan effectiveness.     4.Opioids precipitate bad clinical outcomes, especially transformation to daily headache.     5. They cause disease progression, comorbidity, and excessive health care consumption.           NEXT OPTIONS:    Triptans: Sumatriptan (Imitrex)    Gepants: Nuretc (rimegepant)75 mg >Ubrelvy (ubrogepant) 100 mg    Ditans: Reyvow (lasmiditan) 100 mg (No driving due to sedation)    Fioricet without codeine with Reglan.      Prednisone with Reglan.      LAST RESORT:     DHE NS Trudhesa (Max 2 a week)     C/I: concomitant use of vasoconstrictors like Triptans, strong CY inhibitors such as HAART PIs (eg, ritonavir, nelfinavir, or indinavir) and Macrolides (eg, erythromycin or clarithromycin), CAD, PVD, Stroke/TIA and Uncontrolled HTN.  Serious SEs include Vasospasm and Fibrosis (chronic use).           PREVENTATIVE (MORE ACCURATELY MIGRAINE REDUCTION) MEDICATIONS:         Continue Topiramate/Topamax (TPM) 25 mg PO BID.    On  mg PO QHS.        HELPFUL SUPPLEMENTS:     Helpful supplements include Co-Q 10, B2, Mg, Feverfew (Dolovent combination) and butterbur (Petadolex)        NEUROPHARMACOLOGY     NEXT OPTIONS:         Zonisamide/Zonegran (ZNS) 100-400 mg QHS is a good alternative to TPM in case of SE/AE.     Amitriptyline/Elavil (TCA) slow titration to 100-Age which can cause sleepiness, dry eyes, dry mouth, urinary retention, and rarely cardiac arrhythmias    Propranolol/Inderal  (BB)slow titration to 80 mg BID which can cause low blood pressure, slow heart rate, erectile dysfunction, depression, airway obstruction and heart failure  exacerbations. Cannot be used with migraine associate with focal neurological deficits.    Lamotrigine/Lamictal  (LTG)slow titration to 100 mg BID which can cause serious skin rash and rare cardiac arrhythmias. LTG is superior to other therapies for specifically reducing migraine aura.     ANTI-CGRP AGENTS: Qulipta (alogepant) 60 mg QD, Erenumab (Aimovig) 140 mg SQ Pen monthly (Reported cases of Constipation and BP elevation) , Galcanezumab (Emgality) 120 mg SQ Pen monthly after a loading dose of 240 mg  and Fremnezumab (Ajovy) (Ligand Blocker): 225 mg SQ monthly or 675 mg every 3 months     Botox 200 units every 3 months .        LAST RESORT OPTIONS:      Namenda 10 mg BID     Valproic acid/ Depakote         NEUROMODULATION     Cefaly, Relivion, Nerivio and GammaCore (VNS)             HISTORY OF ZOSTER (SHINGLES)      Recommended vaccination after 6-12 months of recovery.        CERVICO-OCCIPITAL NEURALGIA       Continue  mg PO QHS.        ORTHOSTATIC TACHYCARDIA      POTS evaluation to consider BB.             MEDICAL/SURGICAL COMORBIDITIES     All relevant medical comorbidities noted and managed by primary care physician and medical care team.          HEALTHY LIFESTYLE AND PREVENTATIVE CARE    The patient to adhere to the age-appropriate health maintenance guidelines including screening tests and vaccinations. The patient to adhere to  healthy lifestyle, optimal weight, exercise, healthy diet, good sleep hygiene and avoiding drugs including smoking, alcohol and recreational drugs.        RTC ANNUALLY        Jennifer Trejo MD, FAAN    Attending Neurologist/Epileptologist         Diplomate, American Board of Psychiatry and Neurology    Diplomate, American Board of Clinical Neurophysiology     Fellow, American Academy of Neurology               I spent a total of 40 minutes on the day of the visit.  This includes face to face time and non-face to face time preparing to see the patient (eg, review of tests),  obtaining and/or reviewing separately obtained history, documenting clinical information in the electronic or other health record, independently interpreting results and communicating results to the patient/family/caregiver, or care coordinator.

## 2025-03-25 ENCOUNTER — PATIENT MESSAGE (OUTPATIENT)
Dept: NEUROLOGY | Facility: CLINIC | Age: 35
End: 2025-03-25
Payer: COMMERCIAL

## 2025-04-07 PROBLEM — Q79.60 EHLERS-DANLOS SYNDROME: Status: ACTIVE | Noted: 2025-02-25
